# Patient Record
Sex: FEMALE | Race: WHITE | NOT HISPANIC OR LATINO | URBAN - NONMETROPOLITAN AREA
[De-identification: names, ages, dates, MRNs, and addresses within clinical notes are randomized per-mention and may not be internally consistent; named-entity substitution may affect disease eponyms.]

---

## 2018-12-08 ENCOUNTER — OFFICE VISIT (OUTPATIENT)
Dept: URGENT CARE | Facility: PHYSICIAN GROUP | Age: 23
End: 2018-12-08
Payer: COMMERCIAL

## 2018-12-08 ENCOUNTER — HOSPITAL ENCOUNTER (OUTPATIENT)
Facility: MEDICAL CENTER | Age: 23
End: 2018-12-08
Attending: FAMILY MEDICINE
Payer: COMMERCIAL

## 2018-12-08 VITALS
HEART RATE: 80 BPM | DIASTOLIC BLOOD PRESSURE: 76 MMHG | BODY MASS INDEX: 23.16 KG/M2 | OXYGEN SATURATION: 100 % | SYSTOLIC BLOOD PRESSURE: 122 MMHG | WEIGHT: 139 LBS | HEIGHT: 65 IN | RESPIRATION RATE: 14 BRPM | TEMPERATURE: 97.8 F

## 2018-12-08 DIAGNOSIS — R82.81 PYURIA: ICD-10-CM

## 2018-12-08 DIAGNOSIS — Z32.01 POSITIVE URINE PREGNANCY TEST: ICD-10-CM

## 2018-12-08 DIAGNOSIS — R11.2 NAUSEA AND VOMITING, INTRACTABILITY OF VOMITING NOT SPECIFIED, UNSPECIFIED VOMITING TYPE: ICD-10-CM

## 2018-12-08 LAB
APPEARANCE UR: ABNORMAL
BILIRUB UR STRIP-MCNC: ABNORMAL MG/DL
COLOR UR AUTO: ABNORMAL
GLUCOSE UR STRIP.AUTO-MCNC: ABNORMAL MG/DL
INT CON NEG: NEGATIVE
INT CON POS: POSITIVE
KETONES UR STRIP.AUTO-MCNC: ABNORMAL MG/DL
LEUKOCYTE ESTERASE UR QL STRIP.AUTO: ABNORMAL
NITRITE UR QL STRIP.AUTO: ABNORMAL
PH UR STRIP.AUTO: 8.5 [PH] (ref 5–8)
POC URINE PREGNANCY TEST: POSITIVE
PROT UR QL STRIP: ABNORMAL MG/DL
RBC UR QL AUTO: ABNORMAL
SP GR UR STRIP.AUTO: 1.02
UROBILINOGEN UR STRIP-MCNC: ABNORMAL MG/DL

## 2018-12-08 PROCEDURE — 87086 URINE CULTURE/COLONY COUNT: CPT

## 2018-12-08 PROCEDURE — 81002 URINALYSIS NONAUTO W/O SCOPE: CPT | Performed by: FAMILY MEDICINE

## 2018-12-08 PROCEDURE — 99204 OFFICE O/P NEW MOD 45 MIN: CPT | Performed by: FAMILY MEDICINE

## 2018-12-08 PROCEDURE — 81025 URINE PREGNANCY TEST: CPT | Performed by: FAMILY MEDICINE

## 2018-12-08 RX ORDER — MECLIZINE HYDROCHLORIDE 25 MG/1
TABLET ORAL
Qty: 30 TAB | Refills: 2 | Status: SHIPPED | OUTPATIENT
Start: 2018-12-08

## 2018-12-08 RX ORDER — DOXYLAMINE SUCCINATE AND PYRIDOXINE HYDROCHLORIDE, DELAYED RELEASE TABLETS 10 MG/10 MG 10; 10 MG/1; MG/1
1 TABLET, DELAYED RELEASE ORAL 4 TIMES DAILY PRN
Qty: 60 TAB | Refills: 3 | Status: SHIPPED | OUTPATIENT
Start: 2018-12-08 | End: 2018-12-08

## 2018-12-08 NOTE — PROGRESS NOTES
Chief Complaint:    Chief Complaint   Patient presents with   • Nausea       History of Present Illness:    Boyfriend present. This is a new problem. For 4 days, patient has had nausea, overall at least moderate severity, and getting worse. Last night she started with vomiting. She is able to keep down a little bit of fluids. No fever, nasal symptoms, sore throat, cough, rash, urine symptoms, or diarrhea. She is late on her menstrual period x 2 weeks, when she is typically regular.      Review of Systems:    Constitutional: Negative for fever, chills, and diaphoresis.   Eyes: Negative for change in vision, photophobia, pain, redness, and discharge.  ENT: Negative for ear pain, ear discharge, hearing loss, tinnitus, nasal congestion, nosebleeds, and sore throat.    Respiratory: Negative for cough, hemoptysis, sputum production, shortness of breath, wheezing, and stridor.    Cardiovascular: Negative for chest pain, palpitations, orthopnea, claudication, leg swelling, and PND.   Gastrointestinal: See HPI.  Genitourinary: Negative for dysuria, urinary urgency, urinary frequency, hematuria, and flank pain.   Musculoskeletal: Negative for myalgias, joint pain, neck pain, and back pain.   Skin: Negative for rash and itching.   Neurological: Negative for dizziness, tingling, tremors, sensory change, speech change, focal weakness, seizures, loss of consciousness, and headaches.   Endo: Negative for polydipsia.   Heme: Does not bruise/bleed easily.   Psychiatric/Behavioral: Negative for depression, suicidal ideas, hallucinations, memory loss and substance abuse. The patient is not nervous/anxious and does not have insomnia.        Past Medical History:    No past medical history on file.    Past Surgical History:    No past surgical history on file.    Social History:    Social History     Social History   • Marital status: Single     Spouse name: N/A   • Number of children: N/A   • Years of education: N/A     Occupational  "History   • Not on file.     Social History Main Topics   • Smoking status: Never Smoker   • Smokeless tobacco: Never Used   • Alcohol use Not on file   • Drug use: Yes     Types: Marijuana   • Sexual activity: Not on file     Other Topics Concern   • Not on file     Social History Narrative   • No narrative on file     Family History:    No family history on file.    Medications:    No current outpatient prescriptions on file prior to visit.     No current facility-administered medications on file prior to visit.      Allergies:    No Known Allergies      Vitals:    Vitals:    12/08/18 0922   BP: 122/76   BP Location: Right arm   Patient Position: Sitting   BP Cuff Size: Adult   Pulse: 80   Resp: 14   Temp: 36.6 °C (97.8 °F)   TempSrc: Temporal   SpO2: 100%   Weight: 63 kg (139 lb)   Height: 1.651 m (5' 5\")       Physical Exam:    Constitutional: Vital signs reviewed. Appears well-developed and well-nourished. No acute distress.   Eyes: Sclera white, conjunctivae clear.   ENT: External ears normal. Hearing normal. Nasal mucosa pink. Lips/teeth are normal. Oral mucosa pink and moist. Posterior pharynx: WNL.  Neck: Neck supple.   Cardiovascular: Regular rate and rhythm. No murmur.  Pulmonary/Chest: Respirations non-labored. Clear to auscultation bilaterally.  Abdomen: Bowel sounds are normal active. Soft, non-distended, and non-tender to palpation.  Musculoskeletal: Normal gait. Normal range of motion. No muscular atrophy or weakness.  Neurological: Alert and oriented to person, place, and time. Muscle tone normal. Coordination normal.   Skin: No rashes or lesions. Warm, dry, normal turgor.  Psychiatric: Normal mood and affect. Behavior is normal. Judgment and thought content normal.     Diagnostics:    POCT PREGNANCY (Order #[id number]) on 12/8/18   Component Results     Component Value Ref Range & Units Status   POC Urine Pregnancy Test Positive  Negative Final   Internal Control Positive Positive   Final   Internal " Control Negative Negative   Final   Last Resulted Time   Sat Dec 8, 2018  9:55 AM     POCT URINALYSIS (Order #[id number]) on 12/8/18   Component Results     Component Value Ref Range & Units Status   POC Color  Negative    POC Appearance  Negative    POC Leukocyte Esterase Trace  Negative Final   POC Nitrites Neg  Negative Final   POC Urobiligen Neg  Negative (0.2) mg/dL Final   POC Protein Neg  Negative mg/dL Final   POC Urine PH 8.5   5.0 - 8.0 Final   POC Blood Neg  Negative Final   POC Specific Gravity 1.020  <1.005 - >1.030 Final   POC Ketones Trace  Negative mg/dL Final   POC Bilirubin Neg  Negative mg/dL Final   POC Glucose Neg  Negative mg/dL Final   Last Resulted Time   Sat Dec 8, 2018  9:54 AM       Assessment / Plan:    1. Nausea and vomiting, intractability of vomiting not specified, unspecified vomiting type  - POCT Urinalysis  - POCT PREGNANCY  - meclizine (ANTIVERT) 25 MG Tab; 1 TAB EVERY 6 HOURS ONLY IF NEEDED FOR NAUSEA OR VOMITING. MAY CAUSE DROWSINESS.  Dispense: 30 Tab; Refill: 2    2. Positive urine pregnancy test    3. Pyuria  - URINE CULTURE(NEW); Future      Discussed with them DDX, management options, and risks, benefits, and alternatives to treatment plan agreed upon.    Reviewed with them from Up To Date: Management algorithm for treatment of nausea and vomiting of pregnancy (NVP).    Agreeable to Doxylamine-Pyridoxine medication prescribed and send urine for culture.    Says may call [phone] (M) with urine culture result and OK to leave message with result.    Doxylamine-Pyridoxine not covered by insurance, so sent Rx for Meclizine (Category B) to pharmacy.    Advised to see OB for prenatal care.    She will return to urgent care or go to Emergency Room if getting worse, not better with medication, or any other concerns prior to seeing OB.

## 2018-12-11 LAB
BACTERIA UR CULT: NORMAL
SIGNIFICANT IND 70042: NORMAL
SITE SITE: NORMAL
SOURCE SOURCE: NORMAL